# Patient Record
(demographics unavailable — no encounter records)

---

## 2025-04-05 NOTE — PHYSICAL EXAM
[Appropriately responsive] : appropriately responsive [Alert] : alert [No Acute Distress] : no acute distress [No Lymphadenopathy] : no lymphadenopathy [Soft] : soft [Non-tender] : non-tender [Non-distended] : non-distended [No HSM] : No HSM [No Lesions] : no lesions [No Mass] : no mass [Oriented x3] : oriented x3 [FreeTextEntry5] : No increased work of breathing noted [Examination Of The Breasts] : a normal appearance [No Masses] : no breast masses were palpable [Labia Majora] : normal [Labia Minora] : normal [Normal] : normal [Uterine Adnexae] : normal

## 2025-04-05 NOTE — PLAN
[FreeTextEntry1] : Here for annual exam w/o complaints. Not currently on hormonal contraception and does not desire.  Periods are regular. Pap smear and HPV testing done. GC/CT cxs via pap done. Declined STD blood work testing. Age-related screening tests and timing of tests discussed. Return 1 year or PRN.

## 2025-04-05 NOTE — HISTORY OF PRESENT ILLNESS
[Y] : Patient is sexually active [N] : Patient denies prior pregnancies [Currently Active] : currently active [Men] : men [No] : No [Patient refuses STI testing] : Patient refuses STI testing [FreeTextEntry1] : 34 y/o pt presents in office today for a well women exam with no complaints. Stopped birth control last summer - was having menstrual HAs and wants to continue with barrier methods. LMP 3/4/2025, regular cycles. [PapSmeardate] : 2024 [LMPDate] : 03/04/25 [MensesFreq] : 28-31 [MensesLength] : 5-7

## 2025-04-05 NOTE — HISTORY OF PRESENT ILLNESS
[Y] : Patient is sexually active [N] : Patient denies prior pregnancies [Currently Active] : currently active [Men] : men [No] : No [Patient refuses STI testing] : Patient refuses STI testing [FreeTextEntry1] : 32 y/o pt presents in office today for a well women exam with no complaints. Stopped birth control last summer - was having menstrual HAs and wants to continue with barrier methods. LMP 3/4/2025, regular cycles. [PapSmeardate] : 2024 [LMPDate] : 03/04/25 [MensesFreq] : 28-31 [MensesLength] : 5-7